# Patient Record
Sex: MALE | Race: ASIAN | NOT HISPANIC OR LATINO | URBAN - METROPOLITAN AREA
[De-identification: names, ages, dates, MRNs, and addresses within clinical notes are randomized per-mention and may not be internally consistent; named-entity substitution may affect disease eponyms.]

---

## 2022-08-23 ENCOUNTER — HOSPITAL ENCOUNTER (OUTPATIENT)
Dept: RADIOLOGY | Facility: MEDICAL CENTER | Age: 52
End: 2022-08-23
Attending: FAMILY MEDICINE
Payer: COMMERCIAL

## 2022-08-23 ENCOUNTER — OFFICE VISIT (OUTPATIENT)
Dept: URGENT CARE | Facility: PHYSICIAN GROUP | Age: 52
End: 2022-08-23
Payer: COMMERCIAL

## 2022-08-23 VITALS
TEMPERATURE: 97.5 F | DIASTOLIC BLOOD PRESSURE: 82 MMHG | HEIGHT: 66 IN | RESPIRATION RATE: 16 BRPM | SYSTOLIC BLOOD PRESSURE: 128 MMHG | WEIGHT: 200 LBS | OXYGEN SATURATION: 95 % | BODY MASS INDEX: 32.14 KG/M2 | HEART RATE: 84 BPM

## 2022-08-23 DIAGNOSIS — S99.922A INJURY OF TOE ON LEFT FOOT, INITIAL ENCOUNTER: ICD-10-CM

## 2022-08-23 DIAGNOSIS — S91.209A AVULSION OF TOENAIL, INITIAL ENCOUNTER: ICD-10-CM

## 2022-08-23 DIAGNOSIS — S92.532A CLOSED DISPLACED FRACTURE OF DISTAL PHALANX OF LESSER TOE OF LEFT FOOT, INITIAL ENCOUNTER: ICD-10-CM

## 2022-08-23 DIAGNOSIS — T14.8XXA WOUND OF SKIN: ICD-10-CM

## 2022-08-23 PROCEDURE — 73660 X-RAY EXAM OF TOE(S): CPT | Mod: LT

## 2022-08-23 PROCEDURE — 99203 OFFICE O/P NEW LOW 30 MIN: CPT | Mod: 25 | Performed by: FAMILY MEDICINE

## 2022-08-23 PROCEDURE — 90715 TDAP VACCINE 7 YRS/> IM: CPT | Performed by: FAMILY MEDICINE

## 2022-08-23 PROCEDURE — 90471 IMMUNIZATION ADMIN: CPT | Performed by: FAMILY MEDICINE

## 2022-08-23 RX ORDER — SULFAMETHOXAZOLE AND TRIMETHOPRIM 800; 160 MG/1; MG/1
1 TABLET ORAL EVERY 12 HOURS
Qty: 10 TABLET | Refills: 0 | Status: SHIPPED | OUTPATIENT
Start: 2022-08-23 | End: 2022-08-28

## 2022-08-23 RX ORDER — IBUPROFEN 600 MG/1
600 TABLET ORAL EVERY 8 HOURS PRN
Qty: 12 TABLET | Refills: 0 | Status: SHIPPED | OUTPATIENT
Start: 2022-08-23

## 2022-08-24 NOTE — PROGRESS NOTES
"Subjective     Shaan Heller is a 52 y.o. male who presents with Other (Injured left toe)      - This is a pleasant and nontoxic appearing 52 y.o. male who has come to the walk-in clinic today for:    #1) today was pushing a heavy cart and and wheel ran over Lt big toe. Pain swelling bleeding now      ALLERGIES:  Patient has no allergy information on record.     PMH:  History reviewed. No pertinent past medical history.     PSH:  History reviewed. No pertinent surgical history.    MEDS:    Current Outpatient Medications:   •  sulfamethoxazole-trimethoprim (BACTRIM DS) 800-160 MG tablet, Take 1 Tablet by mouth every 12 hours for 5 days., Disp: 10 Tablet, Rfl: 0  •  mupirocin (BACTROBAN) 2 % Ointment, Apply 1 Application topically 2 times a day., Disp: 22 g, Rfl: 0  •  ibuprofen (MOTRIN) 600 MG Tab, Take 1 Tablet by mouth every 8 hours as needed for Mild Pain., Disp: 12 Tablet, Rfl: 0    ** I have documented what I find to be significant in regards to past medical, social, family and surgical history  in my HPI or under PMH/PSH/FH review section, otherwise it is noncontributory **           HPI    Review of Systems   Musculoskeletal:  Positive for joint pain.   All other systems reviewed and are negative.           Objective     /82   Pulse 84   Temp 36.4 °C (97.5 °F) (Temporal)   Resp 16   Ht 1.676 m (5' 6\")   Wt 90.7 kg (200 lb)   SpO2 95%   BMI 32.28 kg/m²      Physical Exam  Vitals and nursing note reviewed.   Constitutional:       General: He is not in acute distress.     Appearance: Normal appearance. He is well-developed.   HENT:      Head: Normocephalic.   Cardiovascular:      Heart sounds: Normal heart sounds. No murmur heard.  Pulmonary:      Effort: Pulmonary effort is normal. No respiratory distress.   Musculoskeletal:        Feet:    Feet:      Comments: Lt great toe: some edema and bleeding around nail and nail plate is mostly avulsed except distal aspect. Good srom  Neurological:      " Mental Status: He is alert.      Motor: No abnormal muscle tone.   Psychiatric:         Mood and Affect: Mood normal.         Behavior: Behavior normal.                           Assessment & Plan       1. Injury of toe on left foot, initial encounter  DX-TOE(S) 2+ LEFT    sulfamethoxazole-trimethoprim (BACTRIM DS) 800-160 MG tablet    mupirocin (BACTROBAN) 2 % Ointment    ibuprofen (MOTRIN) 600 MG Tab    Referral to Podiatry      2. Wound of skin  Tdap =>8yo IM    sulfamethoxazole-trimethoprim (BACTRIM DS) 800-160 MG tablet    mupirocin (BACTROBAN) 2 % Ointment    ibuprofen (MOTRIN) 600 MG Tab    Referral to Podiatry      3. Closed displaced fracture of distal phalanx of lesser toe of left foot, initial encounter        4. Avulsion of toenail, initial encounter          Xray: no acute findings by [facility]. RADIOLOGY READ PENDING.     - Dx, plan & d/c instructions discussed   - RICE, OTC Motrin and/or Tylenol as needed  - E.R. precautions discussed     Asked to kindly follow up with their PCP's office for a recheck on today's visit, ER if not improving in 2-3 days or if feeling/getting worse. If you do not have a primary care doctor and need to schedule one you may call [facility] at [phone] to do this.    Patient left in stable condition       6cc 1% Lido used for digital block. I used hemostat and scissors to remove nail plate. Wound irrigated w/ saline and dressed w/ xeroform gauze. 2 day recheck advised

## 2022-08-25 ENCOUNTER — OFFICE VISIT (OUTPATIENT)
Dept: URGENT CARE | Facility: PHYSICIAN GROUP | Age: 52
End: 2022-08-25
Payer: COMMERCIAL

## 2022-08-25 VITALS
HEIGHT: 66 IN | WEIGHT: 200 LBS | TEMPERATURE: 98 F | OXYGEN SATURATION: 97 % | RESPIRATION RATE: 18 BRPM | SYSTOLIC BLOOD PRESSURE: 122 MMHG | BODY MASS INDEX: 32.14 KG/M2 | DIASTOLIC BLOOD PRESSURE: 78 MMHG | HEART RATE: 85 BPM

## 2022-08-25 DIAGNOSIS — S91.209D AVULSION OF TOENAIL, SUBSEQUENT ENCOUNTER: ICD-10-CM

## 2022-08-25 DIAGNOSIS — S92.532D CLOSED DISPLACED FRACTURE OF DISTAL PHALANX OF LESSER TOE OF LEFT FOOT WITH ROUTINE HEALING, SUBSEQUENT ENCOUNTER: ICD-10-CM

## 2022-08-25 PROCEDURE — 99213 OFFICE O/P EST LOW 20 MIN: CPT | Performed by: NURSE PRACTITIONER

## 2022-08-25 ASSESSMENT — ENCOUNTER SYMPTOMS
FEVER: 0
MYALGIAS: 1
CHILLS: 0

## 2022-08-25 NOTE — PROGRESS NOTES
"Subjective:     Shaan Heller is a 52 y.o. male who presents for Foot Injury (Per patient had smashed his left foot big toe. Patient not taking his antibiotics. )      HPI  Pt presents for evaluation of a new problem. Shaan is a pleasant 52-year-old gentleman who presents to urgent care today for a follow-up wound visit after sustaining a significant injury to his right great toe as well as a fracture to his distal second metatarsal.  His toenail was removed at his visit 2 days ago and he was placed on mupirocin and Bactrim for which she states he has not started taking yet.  His fracture of his second metatarsal does appear open as there is an abrasion to his skin.  His distal toe has now become erythemic and swollen.  He denies any pain.  He states that he was not aware that he had a fracture in this foot.  He has not started his antibiotics as he was unaware that he needed to start these.  He does have a follow-up appointment with podiatry in 5 days.  His pain is rated as mild.  He has not used any medication for the relief of his symptoms.  Review of Systems   Constitutional:  Negative for chills and fever.   Musculoskeletal:  Positive for joint pain and myalgias.     PMH: History reviewed. No pertinent past medical history.  ALLERGIES: No Known Allergies  SURGHX: History reviewed. No pertinent surgical history.  SOCHX:   Social History     Socioeconomic History    Marital status:    Tobacco Use    Smoking status: Every Day     Types: Cigarettes    Smokeless tobacco: Never   Vaping Use    Vaping Use: Never used   Substance and Sexual Activity    Alcohol use: Never    Drug use: Never     FH: History reviewed. No pertinent family history.      Objective:   /78   Pulse 85   Temp 36.7 °C (98 °F) (Temporal)   Resp 18   Ht 1.676 m (5' 6\")   Wt 90.7 kg (200 lb)   SpO2 97%   BMI 32.28 kg/m²     Physical Exam  Vitals and nursing note reviewed.   Constitutional:       General: He is not in acute " distress.     Appearance: Normal appearance. He is not ill-appearing.   HENT:      Head: Normocephalic and atraumatic.      Right Ear: External ear normal.      Left Ear: External ear normal.      Nose: No congestion or rhinorrhea.      Mouth/Throat:      Mouth: Mucous membranes are moist.   Eyes:      Extraocular Movements: Extraocular movements intact.      Pupils: Pupils are equal, round, and reactive to light.   Cardiovascular:      Rate and Rhythm: Normal rate and regular rhythm.      Pulses: Normal pulses.      Heart sounds: Normal heart sounds.   Pulmonary:      Effort: Pulmonary effort is normal.      Breath sounds: Normal breath sounds.   Abdominal:      General: Abdomen is flat. Bowel sounds are normal.      Palpations: Abdomen is soft.   Musculoskeletal:         General: Normal range of motion.      Cervical back: Normal range of motion and neck supple.        Feet:    Feet:      Comments: Positive for erythema and swelling of left distal second metatarsal.  He denies any pain with palpation.  Right great toenail has been removed and there is scant bloody drainage present.  Right great toenail was cleansed with Hibiclens and normal saline and dressed with Xeroform and Coban.   Skin:     General: Skin is warm and dry.      Capillary Refill: Capillary refill takes less than 2 seconds.   Neurological:      General: No focal deficit present.      Mental Status: He is alert and oriented to person, place, and time. Mental status is at baseline.   Psychiatric:         Mood and Affect: Mood normal.         Behavior: Behavior normal.         Thought Content: Thought content normal.         Judgment: Judgment normal.       Assessment/Plan:   Assessment      1. Closed displaced fracture of distal phalanx of lesser toe of left foot with routine healing, subsequent encounter        2. Avulsion of toenail, subsequent encounter        X-ray results reviewed and discussed with patient as he was unaware that he had a  fracture of the second metatarsal.  Patient educated to start on antibiotics immediately due to open fracture and to prevent infection of toenail avulsion.  Additionally, he is to follow-up with podiatry in 5 days for further treatment and evaluation.  Patient to keep wounds clean and dry.  Ibuprofen/Tylenol as needed for pain.  Patient verbalizes agreement and understanding to plan of care.    AVS handout given and reviewed with patient. Pt educated on red flags and when to seek treatment back in ER or UC.